# Patient Record
Sex: FEMALE | Race: WHITE
[De-identification: names, ages, dates, MRNs, and addresses within clinical notes are randomized per-mention and may not be internally consistent; named-entity substitution may affect disease eponyms.]

---

## 2018-08-03 ENCOUNTER — HOSPITAL ENCOUNTER (EMERGENCY)
Dept: HOSPITAL 62 - ER | Age: 48
Discharge: HOME | End: 2018-08-03
Payer: COMMERCIAL

## 2018-08-03 VITALS — DIASTOLIC BLOOD PRESSURE: 78 MMHG | SYSTOLIC BLOOD PRESSURE: 145 MMHG

## 2018-08-03 DIAGNOSIS — K59.00: Primary | ICD-10-CM

## 2018-08-03 DIAGNOSIS — Z90.49: ICD-10-CM

## 2018-08-03 DIAGNOSIS — R10.9: ICD-10-CM

## 2018-08-03 LAB
ADD MANUAL DIFF: NO
ALBUMIN SERPL-MCNC: 4.6 G/DL (ref 3.5–5)
ALP SERPL-CCNC: 89 U/L (ref 38–126)
ALT SERPL-CCNC: 41 U/L (ref 9–52)
ANION GAP SERPL CALC-SCNC: 15 MMOL/L (ref 5–19)
APPEARANCE UR: CLEAR
APTT PPP: YELLOW S
AST SERPL-CCNC: 31 U/L (ref 14–36)
BASOPHILS # BLD AUTO: 0.1 10^3/UL (ref 0–0.2)
BASOPHILS NFR BLD AUTO: 0.6 % (ref 0–2)
BILIRUB DIRECT SERPL-MCNC: 0.3 MG/DL (ref 0–0.4)
BILIRUB SERPL-MCNC: 0.6 MG/DL (ref 0.2–1.3)
BILIRUB UR QL STRIP: NEGATIVE
BUN SERPL-MCNC: 10 MG/DL (ref 7–20)
CALCIUM: 9.8 MG/DL (ref 8.4–10.2)
CHLORIDE SERPL-SCNC: 104 MMOL/L (ref 98–107)
CO2 SERPL-SCNC: 25 MMOL/L (ref 22–30)
EOSINOPHIL # BLD AUTO: 0.2 10^3/UL (ref 0–0.6)
EOSINOPHIL NFR BLD AUTO: 2 % (ref 0–6)
ERYTHROCYTE [DISTWIDTH] IN BLOOD BY AUTOMATED COUNT: 13.2 % (ref 11.5–14)
GLUCOSE SERPL-MCNC: 109 MG/DL (ref 75–110)
GLUCOSE UR STRIP-MCNC: NEGATIVE MG/DL
HCT VFR BLD CALC: 40.3 % (ref 36–47)
HGB BLD-MCNC: 13.9 G/DL (ref 12–15.5)
KETONES UR STRIP-MCNC: NEGATIVE MG/DL
LYMPHOCYTES # BLD AUTO: 2.5 10^3/UL (ref 0.5–4.7)
LYMPHOCYTES NFR BLD AUTO: 28.1 % (ref 13–45)
MCH RBC QN AUTO: 31.4 PG (ref 27–33.4)
MCHC RBC AUTO-ENTMCNC: 34.5 G/DL (ref 32–36)
MCV RBC AUTO: 91 FL (ref 80–97)
MONOCYTES # BLD AUTO: 0.7 10^3/UL (ref 0.1–1.4)
MONOCYTES NFR BLD AUTO: 7.9 % (ref 3–13)
NEUTROPHILS # BLD AUTO: 5.4 10^3/UL (ref 1.7–8.2)
NEUTS SEG NFR BLD AUTO: 61.4 % (ref 42–78)
NITRITE UR QL STRIP: NEGATIVE
PH UR STRIP: 5 [PH] (ref 5–9)
PLATELET # BLD: 322 10^3/UL (ref 150–450)
POTASSIUM SERPL-SCNC: 4.5 MMOL/L (ref 3.6–5)
PROT SERPL-MCNC: 7.6 G/DL (ref 6.3–8.2)
PROT UR STRIP-MCNC: NEGATIVE MG/DL
RBC # BLD AUTO: 4.43 10^6/UL (ref 3.72–5.28)
SODIUM SERPL-SCNC: 143.5 MMOL/L (ref 137–145)
SP GR UR STRIP: 1.01
TOTAL CELLS COUNTED % (AUTO): 100 %
UROBILINOGEN UR-MCNC: NEGATIVE MG/DL (ref ?–2)
WBC # BLD AUTO: 8.8 10^3/UL (ref 4–10.5)

## 2018-08-03 PROCEDURE — 80053 COMPREHEN METABOLIC PANEL: CPT

## 2018-08-03 PROCEDURE — 85025 COMPLETE CBC W/AUTO DIFF WBC: CPT

## 2018-08-03 PROCEDURE — 81001 URINALYSIS AUTO W/SCOPE: CPT

## 2018-08-03 PROCEDURE — 96374 THER/PROPH/DIAG INJ IV PUSH: CPT

## 2018-08-03 PROCEDURE — 74177 CT ABD & PELVIS W/CONTRAST: CPT

## 2018-08-03 PROCEDURE — 96375 TX/PRO/DX INJ NEW DRUG ADDON: CPT

## 2018-08-03 PROCEDURE — 36415 COLL VENOUS BLD VENIPUNCTURE: CPT

## 2018-08-03 PROCEDURE — 99284 EMERGENCY DEPT VISIT MOD MDM: CPT

## 2018-08-03 PROCEDURE — 96361 HYDRATE IV INFUSION ADD-ON: CPT

## 2018-08-03 NOTE — ER DOCUMENT REPORT
ED General





- General


Chief Complaint: Flank Pain


Stated Complaint: ABDOMINAL PAIN


Time Seen by Provider: 08/03/18 13:10


TRAVEL OUTSIDE OF THE [place] IN LAST 30 DAYS: No





- HPI


Notes: 





Patient is a 48-year-old female with a history of cholecystectomy in April of 

this year, hysterectomy who presents to the ED complaining of lower abdominal 

pain with occasional radiation to the right side 1 day.  Patient states that 

she had a sharp crampy pain yesterday which has continued into today.  Patient 

states she went to an urgent care in they called her ambulance because she was 

driving her vehicle and was in pain.  Patient states that she did have nausea 

and vomiting yesterday but only nausea today without any vomiting.  Patient 

states she has not had a bowel movement in 2 days, but states that she feels 

like she needs to have a movement, but can't expel it.  Patient states that she 

is urinating normally.  She has not had any vaginal discharge, odor, or 

bleeding.  She has no concern of STD or STI.  She has not had any other recent 

illness.  Denies any headache, fever, neck pain, URI, sore throat, chest pain, 

palpitations, syncope, cough, shortness of breath, wheeze, dyspnea, diarrhea, 

urinary retention, dysuria, hematuria, back pain, loss of control of bowel or 

bladder, numbness/tingling, muscle paralysis/weakness, or rash.





- Related Data


Allergies/Adverse Reactions: 


 





Penicillins Allergy (Verified 08/03/18 13:07)


 











Past Medical History





- General


Information source: Patient





- Social History


Smoking Status: Smoker,Current Status Unk


Family History: Reviewed & Not Pertinent


Patient has suicidal ideation: No


Patient has homicidal ideation: No


Renal/ Medical History: Denies: Hx Peritoneal Dialysis





Review of Systems





- Review of Systems


-: Yes All other systems reviewed and negative





Physical Exam





- Notes


Notes: 





PHYSICAL EXAMINATION:





GENERAL: Well-appearing, well-nourished and in no acute distress.





HEAD: Atraumatic, normocephalic.





EYES: Pupils equal round and reactive to light, extraocular movements intact, 

sclera anicteric, conjunctiva are normal.





ENT:  Nares patent and without discharge.  oropharynx clear without exudates.  

No tonsilar hypertrophy or erythema.  Moist mucous membranes. 





NECK: Normal range of motion, supple without lymphadenopathy





LUNGS: Breath sounds clear to auscultation bilaterally and equal.  No wheezes 

rales or rhonchi.





HEART: Regular rate and rhythm without murmurs, rubs, gallops.





ABDOMEN: Soft, nondistended abdomen.  No guarding, no rebound.  No masses 

appreciated.  Normal bowel sounds present.  No CVA tenderness bilaterally.  + 

tenderness to the mid lower abdomen near the umbilicus.  No obvious tenderness 

directly over McBurney point.  No lower pelvic tenderness.  





Musculoskeletal: FROM to passive/active. Strength 5+/5. 





Extremities:  No cyanosis, clubbing, or edema b/l.  Peripheral pulses 2+.  

Capillary refill less than 3 seconds.





NEUROLOGICAL: Normal speech, normal gait.  





PSYCH: Normal mood, normal affect.





SKIN: Warm, Dry, normal turgor, no rashes or lesions noted.





Course





- Re-evaluation


Re-evalutation: 





08/03/18 17:13


Patient is an afebrile, well-hydrated, 48-year-old female who presents to the 

ED with lower abd pain unspecified, suspect correlation with constipation.  

Vitals are acceptable without any significant tachycardia, tachypnea, or 

hypoxia.  PE is otherwise unremarkable.  CBC, CMP, UA unremarkable for acute 

pathology.  Patient is nontoxic-appearing is tolerating p.o. without any 

difficulties. CT scan of the abd/pelvis was unremarkable for acute pathology.  

It did not see the appendix specifically, but did not see any inflammatory 

changes.  She does not have any elevated wbc and is afebrile as well.  An enema 

was given as it did not moderate stool in the right colon.  Pt had a BM 

thereafter and states that she is feeling much better, but still has a "dull 

ache" remaining.  No other labs or imaging warranted at this time based on H&P.

  I did review with pt that I cannot 100% r/o an appendicitis and we will 

perform close observation and f/u.  Low suspicion/risk for bowel obstruction, 

acute cholecystitis, acute cholangitis, perforated diverticulitis, incarcerated 

hernia, pancreatitis, perforated ulcer, peritonitis, sepsis, pelvic 

inflammatory disease, ectopic pregnancy, tubo-ovarian abscess, ovarian torsion, 

or other systemic emergent condition at this time.  Patient is aware that her 

condition can change from initial presentation and she needs to monitor 

symptoms closely and seek medical attention if any acute changes.  I will send 

her home with prescription for mag citrate to perform tonight and zofran.  

Conservative measures otherwise for symptoms.  Recheck with your PCM in 3-5 

days.  Return to the ED with any worsening/concerning symptoms otherwise as 

reviewed in discharge.  Patient is in agreement.








- Laboratory


Result Diagrams: 


 08/03/18 14:16





 08/03/18 14:16


Laboratory results interpreted by me: 


 











  08/03/18





  14:16


 


Urine Blood  SMALL H














Discharge





- Discharge


Clinical Impression: 


 Lower abdominal pain, unspecified





Constipation


Qualifiers:


 Constipation type: unspecified constipation type Qualified Code(s): K59.00 - 

Constipation, unspecified





Condition: Stable


Disposition: HOME, SELF-CARE


Instructions:  Abdominal Pain ([facility]), Constipation ([facility])


Additional Instructions: 


Maintain adequate fluid and food intake


Stool softener/laxative as directed


Zofran as needed


tylenol if needed


Monitor for any worsening symptoms


Make sure you are staying hydrated enough to urinate and have normal BM's


Recheck with your PCM in 12-24 hours with ongoing/worsening symptoms


Return to the ED with any worsening symptoms and/or development of fever, 

headache, chest pain, palpitations, syncope, shortness of breath, trouble 

breathing, abdominal pain, n/v/d, blood in stool/urine, weakness, or other 

worsening symptoms that are concerning to you.  


Prescriptions: 


Magnesium Citrate [Citrate of Magnesia 296 ml Bottle] 296 ml PO ONCE PRN #1 

bottle


 PRN Reason: 


Ondansetron [Zofran Odt 4 mg Tablet] 1 - 2 tab PO Q4H PRN #15 tab.rapdis


 PRN Reason: For Nausea/Vomiting


Forms:  Elevated Blood Pressure


Referrals: 


CHARLIE TOPETE MD [ACTIVE STAFF] - Follow up as needed

## 2018-08-03 NOTE — RADIOLOGY REPORT (SQ)
EXAM DESCRIPTION:  CT ABD/PELVIS WITH IV ONLY



COMPLETED DATE/TIME:  8/3/2018 2:38 pm



REASON FOR STUDY:  rlq abd pain



COMPARISON:  None.



TECHNIQUE:  CT scan of the abdomen and pelvis performed using helical scanning technique with dynamic
 intravenous contrast injection.  No oral contrast. Images reviewed with lung, soft tissue, and bone 
windows. Reconstructed coronal and sagittal MPR images reviewed. Delayed images for evaluation of the
 urinary system also acquired. All images stored on PACS.

All CT scanners at this facility use dose modulation, iterative reconstruction, and/or weight based d
osing when appropriate to reduce radiation dose to as low as reasonably achievable (ALARA).

[facility]: Dose Right  [facility]: CareDose    [facility]: Dose Right    [facility]: Teradose 4D    [facility]: Smart Technologies



CONTRAST TYPE AND DOSE:  contrast/concentration: Isovue 350.00 mg/ml; Total Contrast Delivered: 99.0 
ml; Total Saline Delivered: 54.8 ml



RENAL FUNCTION:  None required. The patient is less than 50 years old.



RADIATION DOSE:  CT Rad equipment meets quality standard of care and radiation dose reduction techniq
ues were employed. CTDIvol: NaN - NaN mGy. DLP: 0 mGy-cm..



LIMITATIONS:  No oral contrast



FINDINGS:  LOWER CHEST: No significant findings. No nodules or infiltrates.

LIVER: Normal size liver with diffuse low attenuation from fatty infiltration.

SPLEEN: Normal size. No focal lesions.

PANCREAS: No masses. No significant calcifications. No adjacent inflammation or peripancreatic fluid 
collections. Pancreatic duct not dilated.

GALLBLADDER: Surgically absent

ADRENAL GLANDS: No significant masses or asymmetry.

RIGHT KIDNEY AND URETER: No solid masses.   No significant calcifications.   No hydronephrosis or hyd
roureter.

LEFT KIDNEY AND URETER: No solid masses.  2.2 cm left midpole renal cortical cyst.  No significant ca
lcifications.   No hydronephrosis or hydroureter.

AORTA AND VESSELS: No aneurysm. No dissection. Renal arteries, SMA, celiac without stenosis.

RETROPERITONEUM: No retroperitoneal adenopathy, hemorrhage or masses.

BOWEL AND PERITONEAL CAVITY: No CT evidence of bowel obstruction or free intraperitoneal air or fluid
.  Moderate stool in right colon.

APPENDIX: Not visualized.  No right lower quadrant inflammatory change

PELVIS: Post hysterectomy.  No free pelvic fluid.  No masses or adenopathy.

ABDOMINAL WALL: No masses. No hernias.

BONES: No significant or acute findings.

OTHER: No other significant finding.



IMPRESSION:  NO SIGNIFICANT OR ACUTE FINDING IN THE ABDOMEN OR PELVIS ON CT SCAN WITH IV CONTRAST.



TECHNICAL DOCUMENTATION:  JOB ID:  [id number]

Quality ID # [id number]: Final reports with documentation of one or more dose reduction techniques (e.g., Au
tomated exposure control, adjustment of the mA and/or kV according to patient size, use of iterative 
reconstruction technique)

 2011 [employer]- All Rights Reserved



Reading location - IP/workstation name: [facility] hospital[facility]

## 2018-08-03 NOTE — ER DOCUMENT REPORT
ED GI/





- General


Chief Complaint: Flank Pain


Stated Complaint: ABDOMINAL PAIN


Time Seen by Provider: 08/03/18 13:10


Notes: 





48-year-old female to the emergency department chief complaint of abdominal 

pain.  States that yesterday she had some achy midline abdominal pain.  Today 

it has radiated to the right lower quadrant.  Severe.  I did call the 911 

number because did not have a car to get here.  From [facility].  

Visiting/on vacation.  Denies any fever.  Does have some nausea but no 

vomiting.  Had her gallbladder removed in April.





I have greeted and performed a rapid initial assessment of this patient.  A 

comprehensive ED assessment and evaluation of the patient, analysis of test 

results and completion of the medical decision making process will be conducted 

by additional ED providers.


TRAVEL OUTSIDE OF THE [place] IN LAST 30 DAYS: No





- HPI


Patient complains to provider of: Abdominal pain





- Related Data


Allergies/Adverse Reactions: 


 





Penicillins Allergy (Verified 08/03/18 13:07)


 











Past Medical History





- General


Information source: Patient





- Social History


Smoking Status: Smoker,Current Status Unk


Family History: Reviewed & Not Pertinent





Review of Systems





- Review of Systems


Gastrointestinal: Abdominal pain, Nausea, Vomiting


Genitourinary: Burning, Dysuria, Flank pain


Musculoskeletal: Back pain


Neurological/Psychological: denies: Confusion, Weakness





Physical Exam





- Respiratory


Respiratory status: No respiratory distress


Chest status: Nontender


Breath sounds: Normal


Chest palpation: Normal





- Cardiovascular


Rhythm: Regular


Heart sounds: Normal auscultation


Murmur: No





- Abdominal


Inspection: Normal


Distension: No distension


Bowel sounds: Normal


Tenderness: Tender - Right lower quadrant.  No guarding or rebound


Organomegaly: No organomegaly